# Patient Record
Sex: FEMALE
[De-identification: names, ages, dates, MRNs, and addresses within clinical notes are randomized per-mention and may not be internally consistent; named-entity substitution may affect disease eponyms.]

---

## 2022-10-24 ENCOUNTER — HOSPITAL ENCOUNTER (OUTPATIENT)
Dept: HOSPITAL 95 - PCU | Age: 67
Setting detail: OBSERVATION
LOS: 1 days | Discharge: HOME | End: 2022-10-25
Attending: HOSPITALIST | Admitting: HOSPITALIST
Payer: SELF-PAY

## 2022-10-24 VITALS — WEIGHT: 200.62 LBS | BODY MASS INDEX: 29.71 KG/M2 | HEIGHT: 69 IN

## 2022-10-24 DIAGNOSIS — Z79.82: ICD-10-CM

## 2022-10-24 DIAGNOSIS — M19.90: ICD-10-CM

## 2022-10-24 DIAGNOSIS — E66.9: ICD-10-CM

## 2022-10-24 DIAGNOSIS — R77.8: ICD-10-CM

## 2022-10-24 DIAGNOSIS — Z88.5: ICD-10-CM

## 2022-10-24 DIAGNOSIS — I10: ICD-10-CM

## 2022-10-24 DIAGNOSIS — I25.2: ICD-10-CM

## 2022-10-24 DIAGNOSIS — E87.6: ICD-10-CM

## 2022-10-24 DIAGNOSIS — E78.5: ICD-10-CM

## 2022-10-24 DIAGNOSIS — G47.00: ICD-10-CM

## 2022-10-24 DIAGNOSIS — Z79.02: ICD-10-CM

## 2022-10-24 DIAGNOSIS — I20.9: Primary | ICD-10-CM

## 2022-10-24 DIAGNOSIS — Z98.84: ICD-10-CM

## 2022-10-24 LAB
ANION GAP SERPL CALCULATED.4IONS-SCNC: 10 MMOL/L (ref 6–16)
BUN SERPL-MCNC: 16 MG/DL (ref 8–24)
CALCIUM SERPL-MCNC: 9.5 MG/DL (ref 8.5–10.1)
CHLORIDE SERPL-SCNC: 108 MMOL/L (ref 98–108)
CK SERPL-CCNC: 54 U/L (ref 26–193)
CO2 SERPL-SCNC: 22 MMOL/L (ref 21–32)
CREAT SERPL-MCNC: 0.58 MG/DL (ref 0.4–1)
GLUCOSE SERPL-MCNC: 92 MG/DL (ref 70–99)
POTASSIUM SERPL-SCNC: 3.5 MMOL/L (ref 3.5–5.5)
SODIUM SERPL-SCNC: 140 MMOL/L (ref 136–145)

## 2022-10-24 PROCEDURE — C1887 CATHETER, GUIDING: HCPCS

## 2022-10-24 PROCEDURE — C1769 GUIDE WIRE: HCPCS

## 2022-10-24 PROCEDURE — C1894 INTRO/SHEATH, NON-LASER: HCPCS

## 2022-10-24 PROCEDURE — A9270 NON-COVERED ITEM OR SERVICE: HCPCS

## 2022-10-24 NOTE — NUR
PT SUMMARY:
 
PT WAS A DIRECT ADMIT FROM [facility]. ARRIVED TO PCU 14 VIA MICHAEL MILLIGAN
PT WAS ON HEPARIN GTT AT 12U/KG/HR. PHARMACY CALLED REGARDING HEPARIN
MANAGEMENT ORDERED LABS PTT TO FOLLOW. PT IS ALERT AND ORIENTED CAN BE
INDEPENDENT IN THE ROOM INSTRUCTED TO CALL BEFORE GETTING OUT OF BED TO ASSIST
WITH LINES AND TUBINGS. PT'S VITALS HRR SR 60'S, BP SYSTOLIC 130-140'S, SATS
ABOVE 95% ON RA, AFEBRILE. HOSPITALIST AND DR GILBERT SAW PT PLAN FOR ANGIO
IN AM, NPO AT MIDNIGHT. PT DENIES ANY CHEST PAIN UPON ARRIVAL TO THE ROOM,
MOSTLY DISCOMFORT ON LEFT UPPER CHEST. LOADING PLAVIX GIVEN 300MG, NS RUNNING
AT 75MLS/HR. NO OTHER ISSUES AT THIS TIME, PT CALLS APPROPRIATELY, CALL LIGHTS
IN REACH, WILL REPORT TO ONCOMING SHIFT NURSE

## 2022-10-25 LAB
ALBUMIN SERPL BCP-MCNC: 3.3 G/DL (ref 3.4–5)
ALBUMIN/GLOB SERPL: 1.2 {RATIO} (ref 0.8–1.8)
ALT SERPL W P-5'-P-CCNC: 20 U/L (ref 12–78)
ANION GAP SERPL CALCULATED.4IONS-SCNC: 10 MMOL/L (ref 6–16)
AST SERPL W P-5'-P-CCNC: 18 U/L (ref 12–37)
BASOPHILS # BLD AUTO: 0.04 K/MM3 (ref 0–0.23)
BASOPHILS NFR BLD AUTO: 1 % (ref 0–2)
BILIRUB SERPL-MCNC: 1.3 MG/DL (ref 0.1–1)
BUN SERPL-MCNC: 12 MG/DL (ref 8–24)
CALCIUM SERPL-MCNC: 9.4 MG/DL (ref 8.5–10.1)
CHLORIDE SERPL-SCNC: 108 MMOL/L (ref 98–108)
CK SERPL-CCNC: 44 U/L (ref 26–193)
CO2 SERPL-SCNC: 25 MMOL/L (ref 21–32)
CREAT SERPL-MCNC: 0.64 MG/DL (ref 0.4–1)
DEPRECATED RDW RBC AUTO: 43.2 FL (ref 35.1–46.3)
EOSINOPHIL # BLD AUTO: 0.06 K/MM3 (ref 0–0.68)
EOSINOPHIL NFR BLD AUTO: 1 % (ref 0–6)
ERYTHROCYTE [DISTWIDTH] IN BLOOD BY AUTOMATED COUNT: 13.2 % (ref 11.7–14.2)
GLOBULIN SER CALC-MCNC: 2.8 G/DL (ref 2.2–4)
GLUCOSE SERPL-MCNC: 98 MG/DL (ref 70–99)
HCT VFR BLD AUTO: 38.6 % (ref 33–51)
HGB BLD-MCNC: 12.8 G/DL (ref 11.5–16)
IMM GRANULOCYTES # BLD AUTO: 0.01 K/MM3 (ref 0–0.1)
IMM GRANULOCYTES NFR BLD AUTO: 0 % (ref 0–1)
LYMPHOCYTES # BLD AUTO: 1.72 K/MM3 (ref 0.84–5.2)
LYMPHOCYTES NFR BLD AUTO: 27 % (ref 21–46)
MCHC RBC AUTO-ENTMCNC: 33.2 G/DL (ref 31.5–36.5)
MCV RBC AUTO: 89 FL (ref 80–100)
MONOCYTES # BLD AUTO: 0.6 K/MM3 (ref 0.16–1.47)
MONOCYTES NFR BLD AUTO: 9 % (ref 4–13)
NEUTROPHILS # BLD AUTO: 3.97 K/MM3 (ref 1.96–9.15)
NEUTROPHILS NFR BLD AUTO: 62 % (ref 41–73)
NRBC # BLD AUTO: 0 K/MM3 (ref 0–0.02)
NRBC BLD AUTO-RTO: 0 /100 WBC (ref 0–0.2)
PLATELET # BLD AUTO: 280 K/MM3 (ref 150–400)
POTASSIUM SERPL-SCNC: 3.5 MMOL/L (ref 3.5–5.5)
PROT SERPL-MCNC: 6.1 G/DL (ref 6.4–8.2)
SODIUM SERPL-SCNC: 143 MMOL/L (ref 136–145)

## 2022-10-25 NOTE — NUR
UPDATE
PT PROVIDED DC INSTRUCTIONS AND MEDICATIONS. IV REMOVED AND INTACT. ALL
QUESTIONS ANSWERED. RIGHT RADIAL SITE RECOVERED PER PROTOCOL WITH NO
COMPLICATIONS. PT TO STAY FOR 30 MORE MIN TO MONITOR RADIAL SITE AND THEN WILL
LEAVE WITH  BY WC

## 2022-10-25 NOTE — NUR
shift summary
 
no acute changes this shift. pt alert, oriented. sp02>90% on ra. vss. denied
cp all shift. up to bathroom to void. no bm this shift. fluids/heparin infused
per emar. npo since midnight for procedure today. pt slept most of night. call
light in reach.